# Patient Record
Sex: OTHER/UNKNOWN | Race: WHITE | HISPANIC OR LATINO | URBAN - METROPOLITAN AREA
[De-identification: names, ages, dates, MRNs, and addresses within clinical notes are randomized per-mention and may not be internally consistent; named-entity substitution may affect disease eponyms.]

---

## 2017-04-21 ENCOUNTER — APPOINTMENT (OUTPATIENT)
Age: 22
Setting detail: DERMATOLOGY
End: 2017-04-24

## 2017-04-21 VITALS
HEIGHT: 65 IN | SYSTOLIC BLOOD PRESSURE: 120 MMHG | DIASTOLIC BLOOD PRESSURE: 75 MMHG | WEIGHT: 130 LBS | HEART RATE: 69 BPM

## 2017-04-21 DIAGNOSIS — L70.0 ACNE VULGARIS: ICD-10-CM

## 2017-04-21 PROCEDURE — 99202 OFFICE O/P NEW SF 15 MIN: CPT

## 2017-04-21 PROCEDURE — OTHER PRESCRIPTION: OTHER

## 2017-04-21 PROCEDURE — OTHER COUNSELING: OTHER

## 2017-04-21 PROCEDURE — OTHER TREATMENT REGIMEN: OTHER

## 2017-04-21 PROCEDURE — OTHER MIPS QUALITY: OTHER

## 2017-04-21 RX ORDER — AZELAIC ACID 0.15 G/G
15 AEROSOL, FOAM TOPICAL QAM
Qty: 1 | Refills: 3 | Status: ERX | COMMUNITY
Start: 2017-04-21

## 2017-04-21 ASSESSMENT — LOCATION ZONE DERM: LOCATION ZONE: FACE

## 2017-04-21 ASSESSMENT — LOCATION DETAILED DESCRIPTION DERM
LOCATION DETAILED: LEFT CENTRAL MALAR CHEEK
LOCATION DETAILED: RIGHT CENTRAL MALAR CHEEK

## 2017-04-21 ASSESSMENT — LOCATION SIMPLE DESCRIPTION DERM
LOCATION SIMPLE: RIGHT CHEEK
LOCATION SIMPLE: LEFT CHEEK

## 2017-04-21 NOTE — PROCEDURE: MIPS QUALITY
Detail Level: Detailed
Quality 130: Documentation Of Current Medications In The Medical Record: Current Medications Documented
Quality 128: Preventive Care And Screening: Body Mass Index (Bmi) Screening And Follow-Up Plan: BMI is documented within normal parameters and no follow-up plan is required.
Quality 226: Preventive Care And Screening: Tobacco Use: Screening And Cessation Intervention: Patient screened for tobacco and never smoked

## 2017-04-21 NOTE — PROCEDURE: TREATMENT REGIMEN
Detail Level: Zone
Initiate Treatment: Finacea Foam, apply to face every morning after cleansing
Otc Regimen: Recommend OTC Cetaphil Oil Control facial cleanser once a day, and the Neutrogena facial cleanser once a day

## 2017-05-26 ENCOUNTER — APPOINTMENT (OUTPATIENT)
Age: 22
Setting detail: DERMATOLOGY
End: 2017-05-30

## 2017-05-26 VITALS
WEIGHT: 130 LBS | DIASTOLIC BLOOD PRESSURE: 69 MMHG | HEART RATE: 77 BPM | SYSTOLIC BLOOD PRESSURE: 99 MMHG | HEIGHT: 65 IN

## 2017-05-26 DIAGNOSIS — L70.0 ACNE VULGARIS: ICD-10-CM

## 2017-05-26 PROCEDURE — 99213 OFFICE O/P EST LOW 20 MIN: CPT

## 2017-05-26 PROCEDURE — OTHER TREATMENT REGIMEN: OTHER

## 2017-05-26 PROCEDURE — OTHER MIPS QUALITY: OTHER

## 2017-05-26 PROCEDURE — OTHER COUNSELING: OTHER

## 2017-05-26 ASSESSMENT — LOCATION DETAILED DESCRIPTION DERM
LOCATION DETAILED: LEFT INFERIOR MEDIAL FOREHEAD
LOCATION DETAILED: LEFT CENTRAL MALAR CHEEK
LOCATION DETAILED: RIGHT CENTRAL MALAR CHEEK

## 2017-05-26 ASSESSMENT — LOCATION SIMPLE DESCRIPTION DERM
LOCATION SIMPLE: LEFT FOREHEAD
LOCATION SIMPLE: RIGHT CHEEK
LOCATION SIMPLE: LEFT CHEEK

## 2017-05-26 ASSESSMENT — LOCATION ZONE DERM: LOCATION ZONE: FACE

## 2017-05-26 NOTE — PROCEDURE: MIPS QUALITY
Quality 130: Documentation Of Current Medications In The Medical Record: Current Medications Documented
Quality 226: Preventive Care And Screening: Tobacco Use: Screening And Cessation Intervention: Patient screened for tobacco and never smoked
Quality 110: Preventive Care And Screening: Influenza Immunization: Influenza Immunization not Administered because Patient Refused.
Detail Level: Detailed
Quality 128: Preventive Care And Screening: Body Mass Index (Bmi) Screening And Follow-Up Plan: BMI is documented within normal parameters and no follow-up plan is required.

## 2017-05-26 NOTE — PROCEDURE: TREATMENT REGIMEN
Discontinue Regimen: Finacea foam
Samples Given: Clindagel apply to face in the morning after washing
Detail Level: Zone
Plan: Sunscreen apply daily

## 2017-07-07 ENCOUNTER — RX ONLY (RX ONLY)
Age: 22
End: 2017-07-07

## 2017-07-07 RX ORDER — CLINDAMYCIN PHOSPHATE 10 MG/ML
1 GEL TOPICAL QAM
Qty: 1 | Refills: 3 | Status: ERX

## 2017-07-28 ENCOUNTER — APPOINTMENT (OUTPATIENT)
Age: 22
Setting detail: DERMATOLOGY
End: 2017-07-28

## 2017-07-28 VITALS
SYSTOLIC BLOOD PRESSURE: 117 MMHG | HEART RATE: 63 BPM | DIASTOLIC BLOOD PRESSURE: 74 MMHG | HEIGHT: 65 IN | WEIGHT: 130 LBS

## 2017-07-28 DIAGNOSIS — L70.0 ACNE VULGARIS: ICD-10-CM

## 2017-07-28 PROCEDURE — OTHER MIPS QUALITY: OTHER

## 2017-07-28 PROCEDURE — OTHER PRESCRIPTION: OTHER

## 2017-07-28 PROCEDURE — OTHER COUNSELING: OTHER

## 2017-07-28 PROCEDURE — 99214 OFFICE O/P EST MOD 30 MIN: CPT

## 2017-07-28 PROCEDURE — OTHER TREATMENT REGIMEN: OTHER

## 2017-07-28 RX ORDER — CLINDAMYCIN PHOSPHATE 10 MG/1
1 SWAB TOPICAL QAM
Qty: 1 | Refills: 1 | Status: ERX | COMMUNITY
Start: 2017-07-28

## 2017-07-28 ASSESSMENT — LOCATION SIMPLE DESCRIPTION DERM
LOCATION SIMPLE: LEFT CHEEK
LOCATION SIMPLE: RIGHT CHEEK

## 2017-07-28 ASSESSMENT — LOCATION ZONE DERM: LOCATION ZONE: FACE

## 2017-07-28 ASSESSMENT — LOCATION DETAILED DESCRIPTION DERM
LOCATION DETAILED: RIGHT CENTRAL MALAR CHEEK
LOCATION DETAILED: LEFT CENTRAL MALAR CHEEK

## 2017-07-28 NOTE — PROCEDURE: TREATMENT REGIMEN
Samples Given: Retin-A Micro (call if an prescription is wanted) and Clindacin swab
Detail Level: Zone
Initiate Treatment: Clindacin swabs every morning after cleansing.\\nRetin-A Micro, apply a pea size amount to face every Monday, Wednesday, Friday evening after cleansing

## 2017-08-08 ENCOUNTER — RX ONLY (RX ONLY)
Age: 22
End: 2017-08-08

## 2017-08-08 RX ORDER — TRETINOIN 0.8 MG/G
0.08 GEL TOPICAL QOHS
Qty: 1 | Refills: 3 | Status: ERX

## 2017-09-08 ENCOUNTER — RX ONLY (RX ONLY)
Age: 22
End: 2017-09-08

## 2017-09-08 RX ORDER — TRETINOIN 0.8 MG/G
1 GEL TOPICAL QHS
Qty: 50 | Refills: 4 | Status: ERX

## 2017-09-13 ENCOUNTER — RX ONLY (RX ONLY)
Age: 22
End: 2017-09-13

## 2017-09-13 RX ORDER — ADAPALENE 3 MG/G
1 GEL TOPICAL QHS
Qty: 45 | Refills: 3 | Status: ERX | COMMUNITY
Start: 2017-09-13

## 2017-10-31 ENCOUNTER — APPOINTMENT (OUTPATIENT)
Age: 22
Setting detail: DERMATOLOGY
End: 2017-11-01

## 2017-10-31 DIAGNOSIS — L70.0 ACNE VULGARIS: ICD-10-CM

## 2017-10-31 PROCEDURE — 99213 OFFICE O/P EST LOW 20 MIN: CPT

## 2017-10-31 PROCEDURE — OTHER PRESCRIPTION: OTHER

## 2017-10-31 PROCEDURE — OTHER COUNSELING: OTHER

## 2017-10-31 PROCEDURE — OTHER TREATMENT REGIMEN: OTHER

## 2017-10-31 PROCEDURE — OTHER MIPS QUALITY: OTHER

## 2017-10-31 RX ORDER — TAZAROTENE 1 MG/G
0.1 CREAM TOPICAL QHS
Qty: 1 | Refills: 3 | Status: ERX | COMMUNITY
Start: 2017-10-31

## 2017-10-31 ASSESSMENT — LOCATION DETAILED DESCRIPTION DERM
LOCATION DETAILED: RIGHT CENTRAL MALAR CHEEK
LOCATION DETAILED: LEFT CENTRAL MALAR CHEEK

## 2017-10-31 ASSESSMENT — LOCATION SIMPLE DESCRIPTION DERM
LOCATION SIMPLE: LEFT CHEEK
LOCATION SIMPLE: RIGHT CHEEK

## 2017-10-31 ASSESSMENT — LOCATION ZONE DERM: LOCATION ZONE: FACE

## 2017-10-31 NOTE — PROCEDURE: TREATMENT REGIMEN
Start Regimen: Neuac, apply a pea size amount to face every morning after cleansing.\\nTazarotene 0.1% cream, apply a pea size amount every other evening after cleansing for two weeks. If tolerated, increase to every evening Initiate Regimen: Neuac, apply a pea size amount to face every morning after cleansing.\\nTazarotene 0.1% cream, apply a pea size amount every other evening after cleansing for two weeks. If tolerated, increase to every evening

## 2017-10-31 NOTE — PROCEDURE: MIPS QUALITY
Quality 226: Preventive Care And Screening: Tobacco Use: Screening And Cessation Intervention: Patient screened for tobacco and never smoked
Quality 128: Preventive Care And Screening: Body Mass Index (Bmi) Screening And Follow-Up Plan: BMI is documented within normal parameters and no follow-up plan is required.
Quality 130: Documentation Of Current Medications In The Medical Record: Current Medications Documented
Quality 110: Preventive Care And Screening: Influenza Immunization: Influenza Immunization not Administered because Patient Refused.
Detail Level: Detailed

## 2018-02-01 ENCOUNTER — APPOINTMENT (OUTPATIENT)
Age: 23
Setting detail: DERMATOLOGY
End: 2018-02-06

## 2018-02-01 ENCOUNTER — RX ONLY (RX ONLY)
Age: 23
End: 2018-02-01

## 2018-02-01 VITALS
HEART RATE: 88 BPM | WEIGHT: 130 LBS | SYSTOLIC BLOOD PRESSURE: 110 MMHG | HEIGHT: 65 IN | DIASTOLIC BLOOD PRESSURE: 70 MMHG

## 2018-02-01 DIAGNOSIS — L70.0 ACNE VULGARIS: ICD-10-CM

## 2018-02-01 PROCEDURE — OTHER TREATMENT REGIMEN: OTHER

## 2018-02-01 PROCEDURE — OTHER MIPS QUALITY: OTHER

## 2018-02-01 PROCEDURE — 99213 OFFICE O/P EST LOW 20 MIN: CPT

## 2018-02-01 PROCEDURE — OTHER COUNSELING: OTHER

## 2018-02-01 RX ORDER — TAZAROTENE 1 MG/G
0.1 CREAM TOPICAL QHS
Qty: 1 | Refills: 3 | Status: ERX

## 2018-02-01 RX ORDER — CLINDAMYCIN PHOSPHATE AND BENZOYL PEROXIDE 10; 50 MG/G; MG/G
1.2 GEL TOPICAL QAM
Qty: 1 | Refills: 3 | Status: ERX

## 2018-02-01 ASSESSMENT — LOCATION DETAILED DESCRIPTION DERM
LOCATION DETAILED: LEFT CENTRAL MALAR CHEEK
LOCATION DETAILED: RIGHT CENTRAL MALAR CHEEK

## 2018-02-01 ASSESSMENT — LOCATION SIMPLE DESCRIPTION DERM
LOCATION SIMPLE: LEFT CHEEK
LOCATION SIMPLE: RIGHT CHEEK

## 2018-02-01 ASSESSMENT — LOCATION ZONE DERM: LOCATION ZONE: FACE

## 2018-02-01 NOTE — PROCEDURE: MIPS QUALITY
Quality 128: Preventive Care And Screening: Body Mass Index (Bmi) Screening And Follow-Up Plan: BMI is documented within normal parameters and no follow-up plan is required.
Quality 130: Documentation Of Current Medications In The Medical Record: Current Medications Documented
Quality 110: Preventive Care And Screening: Influenza Immunization: Influenza Immunization not Administered because Patient Refused.
Detail Level: Detailed
Quality 226: Preventive Care And Screening: Tobacco Use: Screening And Cessation Intervention: Patient screened for tobacco and never smoked

## 2018-02-01 NOTE — PROCEDURE: TREATMENT REGIMEN
Continue Regimen: Neuac every morning after cleansing.\\nTazarotene every evening after cleansing
Detail Level: Zone

## 2018-04-03 ENCOUNTER — RX ONLY (RX ONLY)
Age: 23
End: 2018-04-03

## 2018-04-03 RX ORDER — DOXYCYCLINE 100 MG/1
100MG TABLET, FILM COATED ORAL QD
Qty: 30 | Refills: 1 | Status: ERX | COMMUNITY
Start: 2018-04-03

## 2018-06-05 ENCOUNTER — APPOINTMENT (OUTPATIENT)
Age: 23
Setting detail: DERMATOLOGY
End: 2018-06-06

## 2018-06-05 VITALS
HEIGHT: 65 IN | HEART RATE: 60 BPM | WEIGHT: 130 LBS | SYSTOLIC BLOOD PRESSURE: 119 MMHG | DIASTOLIC BLOOD PRESSURE: 75 MMHG

## 2018-06-05 DIAGNOSIS — L70.0 ACNE VULGARIS: ICD-10-CM

## 2018-06-05 PROCEDURE — OTHER MIPS QUALITY: OTHER

## 2018-06-05 PROCEDURE — OTHER PRESCRIPTION: OTHER

## 2018-06-05 PROCEDURE — 99213 OFFICE O/P EST LOW 20 MIN: CPT

## 2018-06-05 PROCEDURE — OTHER COUNSELING: OTHER

## 2018-06-05 PROCEDURE — OTHER TREATMENT REGIMEN: OTHER

## 2018-06-05 RX ORDER — CEPHALEXIN 500 MG/1
1 CAPSULE ORAL BID
Qty: 20 | Refills: 1 | Status: ERX | COMMUNITY
Start: 2018-06-05

## 2018-06-05 ASSESSMENT — LOCATION SIMPLE DESCRIPTION DERM
LOCATION SIMPLE: RIGHT CHEEK
LOCATION SIMPLE: LEFT CHEEK

## 2018-06-05 ASSESSMENT — LOCATION DETAILED DESCRIPTION DERM
LOCATION DETAILED: LEFT CENTRAL MALAR CHEEK
LOCATION DETAILED: RIGHT CENTRAL MALAR CHEEK

## 2018-06-05 ASSESSMENT — LOCATION ZONE DERM: LOCATION ZONE: FACE

## 2018-06-05 NOTE — PROCEDURE: TREATMENT REGIMEN
Plan: Follow up as needed.
Initiate Treatment: Take Keflex 500 mg capsule twice daily x 10 days with food then stop. \\nWhen using during flare ups, take twice daily x 5 days.
Continue Regimen: Neuac every morning after cleansing.\\nTazarotene every evening after cleansing.
Detail Level: Zone